# Patient Record
Sex: FEMALE | Race: WHITE | NOT HISPANIC OR LATINO | Employment: FULL TIME | URBAN - METROPOLITAN AREA
[De-identification: names, ages, dates, MRNs, and addresses within clinical notes are randomized per-mention and may not be internally consistent; named-entity substitution may affect disease eponyms.]

---

## 2021-10-21 NOTE — PROGRESS NOTES
RECORDS STATUS - BREAST    RECORDS REQUESTED FROM: [facility]   DATE REQUESTED: 10/25/2021   NOTES DETAILS STATUS   OFFICE NOTE from referring provider     OFFICE NOTE from medical oncologist N/A    OFFICE NOTE from surgeon Complete See Breast Biopsies in Epic 10/13/2021 and 9/21/2021   OFFICE NOTE from radiation oncologist     DISCHARGE SUMMARY from hospital     DISCHARGE REPORT from the ER     OPERATIVE REPORT Complete See Breast Biopsies in Epic 10/13/2021 and 9/21/2021   MEDICATION LIST Complete [facility]   CLINICAL TRIAL TREATMENTS TO DATE     LABS     REQUEST BLOCKS FOR ALL BREAST CANCER PTS     PATHOLOGY REPORTS  (Tissue diagnosis, Stage, ER/VA percentage positive and intensity of staining, HER2 IHC, FISH, and all biopsies from breast and any distant metastasis)                 Complete`- Report from [facility] in [facility] 10/13/2021   A) RIGHT BREAST, MAGNETIC SEED-LOCALIZED LUMPECTOMY:   1. Invasive ductal carcinoma, Gre[place] grade II of III       a. Size: 1.1 cm       b. Core biopsy site is associated with tumor   2. Ductal carcinoma in situ (DCIS): Extensive (nuclear grade 3, micropapillary and cribriform) measuring at least 2.2 cm   3. Margins:       a. Invasive carcinoma is 0.2 cm from the (initial) inferior margin       b. DCIS is 0.15 cm from the medial margin (single focus)   4. Breast Ancillary Testing: performed on previous biopsy ([id number])       a. Hormone Receptors:             Estrogen receptor: Positive (98%, strong staining)             Progesterone receptor: Positive (96%, strong staining)       b. HER2 by IHC: Negative (1+ by manual morphometry)   5. Surrounding breast with fibrocystic change     9/21/2021   A) RIGHT BREAST, 9:00, 1 CM FROM NIPPLE, ULTRASOUND-GUIDED CORE BIOPSY:   1. Invasive ductal carcinoma      a. Greentop grade: II of III; Greentop score: 7 of 9      b. Angio-lymphatic invasion: Absent      c. Associated DCIS: Present      d. Subtype: Solid, clinging        e. Grade  of DCIS: 3 of 3   2. Breast Ancillary Testing:         a. Hormone Receptors:             Estrogen receptor: Positive (98%, strong staining)             Progesterone receptor: Positive (96%, strong staining)       b. HER2 by IHC: Negative (1+ by manual morphometry)   GENONOMIC TESTING     TYPE:   (Next Generation Sequencing, including Foundation One testing, and Oncotype score)     IMAGING (NEED IMAGES & REPORT)     CT SCANS     MRI     MAMMO Complete - [facility] 10/14/2021   ULTRASOUND Complete - [facility]    PET     BONE SCAN     BRAIN MRI       Action    Action Taken 10/21/2021 11AM JOVANNI     I called Magalie to have imaging pushed to PACS [phone]     10/22/2021 8:42AM JOVANNI  I resolved imaging from Magalie in PACS

## 2021-10-25 ENCOUNTER — PRE VISIT (OUTPATIENT)
Dept: RADIATION ONCOLOGY | Facility: CLINIC | Age: 56
End: 2021-10-25

## 2021-10-25 ENCOUNTER — OFFICE VISIT (OUTPATIENT)
Dept: RADIATION ONCOLOGY | Facility: CLINIC | Age: 56
End: 2021-10-25
Payer: COMMERCIAL

## 2021-10-25 VITALS
OXYGEN SATURATION: 99 % | HEART RATE: 68 BPM | WEIGHT: 169.2 LBS | DIASTOLIC BLOOD PRESSURE: 76 MMHG | SYSTOLIC BLOOD PRESSURE: 157 MMHG | TEMPERATURE: 98.4 F | RESPIRATION RATE: 16 BRPM

## 2021-10-25 DIAGNOSIS — C50.411 MALIGNANT NEOPLASM OF UPPER-OUTER QUADRANT OF RIGHT BREAST IN FEMALE, ESTROGEN RECEPTOR POSITIVE (H): Primary | ICD-10-CM

## 2021-10-25 DIAGNOSIS — F43.21 GRIEF: ICD-10-CM

## 2021-10-25 DIAGNOSIS — Z17.0 MALIGNANT NEOPLASM OF UPPER-OUTER QUADRANT OF RIGHT BREAST IN FEMALE, ESTROGEN RECEPTOR POSITIVE (H): Primary | ICD-10-CM

## 2021-10-25 DIAGNOSIS — L58.9 RADIATION DERMATITIS: ICD-10-CM

## 2021-10-25 PROCEDURE — 77263 THER RADIOLOGY TX PLNG CPLX: CPT | Performed by: RADIOLOGY

## 2021-10-25 PROCEDURE — G0463 HOSPITAL OUTPT CLINIC VISIT: HCPCS

## 2021-10-25 PROCEDURE — 99205 OFFICE O/P NEW HI 60 MIN: CPT | Mod: 25 | Performed by: RADIOLOGY

## 2021-10-25 RX ORDER — FEXOFENADINE HCL 180 MG/1
180 TABLET ORAL DAILY
Qty: 60 TABLET | Refills: 0 | Status: SHIPPED | OUTPATIENT
Start: 2021-10-25

## 2021-10-25 RX ORDER — IBUPROFEN 200 MG
TABLET ORAL
COMMUNITY

## 2021-10-25 RX ORDER — ACETAMINOPHEN 325 MG/1
650 TABLET ORAL
COMMUNITY

## 2021-10-25 RX ORDER — MOMETASONE FUROATE 1 MG/ML
SOLUTION TOPICAL 3 TIMES DAILY PRN
Qty: 60 ML | Refills: 1 | Status: SHIPPED | OUTPATIENT
Start: 2021-10-25

## 2021-10-25 NOTE — Clinical Note
10/25/2021         RE: Terrie Fernandes  [address] [place] [address]        Dear Colleague,    Thank you for referring your patient, Terrie Fernandes, to the [facility] RADIATION ONCOLOGY [place]. Please see a copy of my visit note below.          [facility] Radiation Oncology Consult Note      Patient: Terrie Frenandes  MRN: [id number]  Date of Service: 10/25/2021    Assessment:       ICD-10-CM    1. Malignant neoplasm of upper-outer quadrant of right breast in female, estrogen receptor positive (H)  C50.411 fexofenadine (ALLEGRA) 180 MG tablet    Z17.0 mometasone (ELOCON) 0.1 % external solution     Oncology Psychotherapist Referral   2. Radiation dermatitis  L58.9 fexofenadine (ALLEGRA) 180 MG tablet     mometasone (ELOCON) 0.1 % external solution   3. Grief  F43.21 Oncology Psychotherapist Referral        Impression/Plan:   56 year old premenopausal female with IDC grade II, 1.1 cm, DCIS grade III, at least 2.2 cm, all margins negative (0.2cm from inferior IDC, 0.15cm from medial margin DCIS), (New posterior and inferior margins resected negative ) ER/OK pos, HER-2 negative. 0/4 SLN pos. Required aspiration      1. She just had aspiration earlier today and has compression dressing on. To be seen by surgery next week.  Hopefully won't need more aspiration as likely to re-accumulate. The external compression provided by surgical team should help.  Gentle ROM in preparation for XRT, but patient understands to avoid a lot of activity and aggressive ROM as that will exacerbate seroma.      2.  Three common misconceptions of radiation were addressed:              1) there is no transfer of heat, so no burns in traditional sense. Skin erythema from irritation.              2) there is no radioactivity at anytime during or after completion of each treatment.Treatment is just energy passing through target.               3) generally radiation does not cause immunosuppression.     3. Common  side effects to expect are fatigue and skin reaction which typically occur 1-2 weeks after start of radiation therapy.  Symptoms typically worsen throughout treatment, peak 1-2 weeks after completion of radiation, and then typically resolve over the next several weeks. We briefly discussed skin cares but will expand on this further in clinic when symptoms occur. There is a very small chance of cancer arising from radiation therapy but I explained the most vulnerable population for these tumors is the younger population (pediatric and young adult patients)  as these uncommon tumors typically arise 20+ years after treatment. The patient voiced understanding of the information discussed  .    4. Her   5 years ago from acute leukemia, so being in cancer world again understandably very difficult - referral to [facility]      Intent of Therapy: Curative  Patient on concurrent Herceptin No  Adjuvant hormonal therapy: Yes Agent: TBD  Start: Following radiation  Chemotherapy: N/A   Intended fractionation schedule:  Hypofractionation with boost    Breast cancer risk factors:   No family history of breast or ovarian cancer.   LMP 10/17/2021    LMP Dates from Last 4 Encounters:   No data found for LMP       We recommend adjuvant radiation as part of her breast conserving therapy to decrease her chance of local recurrence to the single digits. ROM stretches and skin care were discussed with the patient. She understands that these maneuvers need to continue for 6 months following completion of radiation as skin and muscle fibrosis continue to form for weeks to months following completion of therapy.      Side effects that may occur during or within weeks after radiation therapy      Fatigue and general weakness    Darkening, irritation, itchiness, redness, dryness, erythema, peeling, scabbing, ulceration and contraction of the skin of the breast and chest    Swelling of the breast    Loss of armpit hair    Lung  irritation    Decrease in appetite    Side effects that may occur months or years after radiation therapy      Development of another tumor or cancer    Thickening, telangiectasias (development of spider like blood vessels in the skin) and ulceration of the skin of the breast and chest    Firming, fibrosis (scar tissue), fat necrosis, and distortion of the breast    Poor healing after a trauma or surgery in the irradiated area    Nerve damage resulting in loss of arm strength and sensation    Lung inflammation of fibrosis causing cough, fever and shortness of breath    Fracture of the ribs    Swellingof an arm and hand    The risks, benefits and alternatives to radiation therapy were outlined with the patient. All questions were answered and a consent was signed.      Subjective:   This patient was referred to myself by Chasidy Barragan PA-C for consideration of radiation therapy.    HPI:  Terrie Fernandes is a 56 year old female with right breast IDC.    The patient had a bilateral screening mammogram on 9/20/2021, imaging studies showed an irregular mass within the right breast at 9:00 position. A needle biopsy of right breast on 9/21/2021 revealed IDC grade II and DCIS. MR bilateral breast on 9/28/2021 confirmed biopsy proven malignancy in right breast without evidence of further disease in either breast.      The patient had a right breast lumpectomy with SLN biopsy performed on 10/13/2021, final pathology showed IDC grade II, 1.1 cm, DCIS grade III, at least 2.2 cm, final margins negative (Initial 0.2cm from inferior IDC, 0.15cm from medial margin DCIS- additional tissue taken negative for malignancy ), ER/MT pos, HER-2 negative. 0/4 SLN pos. Post op course complicated by seroma needing aspiration.     Past Medical History:   Diagnosis Date     Cancer (H)      Past Surgical History:   Procedure Laterality Date     BREAST SURGERY       Current Outpatient Medications   Medication     fexofenadine (ALLEGRA) 180 MG  tablet     mometasone (ELOCON) 0.1 % external solution     acetaminophen (TYLENOL) 325 MG tablet     ibuprofen (ADVIL/MOTRIN) 200 MG tablet     No current facility-administered medications for this visit.     Sumatriptan  Social History     Socioeconomic History     Marital status:      Spouse name: Not on file     Number of children: Not on file     Years of education: Not on file     Highest education level: Not on file   Occupational History     Not on file   Tobacco Use     Smoking status: Never Smoker     Smokeless tobacco: Never Used   Substance and Sexual Activity     Alcohol use: Not on file     Drug use: Never     Sexual activity: Not on file   Other Topics Concern     Parent/sibling w/ CABG, MI or angioplasty before 65F 55M? Not Asked   Social History Narrative     Not on file     Social Determinants of Health     Financial Resource Strain:      Difficulty of Paying Living Expenses:    Food Insecurity:      Worried About Running Out of Food in the Last Year:      Ran Out of Food in the Last Year:    Transportation Needs:      Lack of Transportation (Medical):      Lack of Transportation (Non-Medical):    Physical Activity:      Days of Exercise per Week:      Minutes of Exercise per Session:    Stress:      Feeling of Stress :    Social Connections:      Frequency of Communication with Friends and Family:      Frequency of Social Gatherings with Friends and Family:      Attends [employer] Services:      Active Member of Clubs or Organizations:      Attends Club or Organization Meetings:      Marital Status:    Intimate Partner Violence:      Fear of Current or Ex-Partner:      Emotionally Abused:      Physically Abused:      Sexually Abused:      History reviewed. No pertinent family history.    ROS:  Reviewed with Terrie Fernandes today.    General     EENT     Respiratory          Cardiovascular  Cardiovascular (WDL): All cardiovascular elements are within defined limits  Endocrine      Gastrointestinal     Musculoskeletal     Integumentary                  Neurological     Psychological/Emotional   Patient Coping: Accepting;Open/discussion  Hematological/Lymphatic     Dermatologic     Genitourinary/Reproductive     Reproductive     Pain                  AUA Assessment                    Accompanied by         Objective:        Exam:    Vitals:    10/25/21 1300 10/25/21 1345   BP:  (!) 157/76   Pulse:  68   Resp:  16   Temp:  98.4  F (36.9  C)   TempSrc:  Oral   SpO2:  99%   Weight:  76.7 kg (169 lb 3.2 oz)   PainSc: No Pain (0)        GENERAL: No acute distress. Cooperative in conversation. Mask on.   Breasts:  expected post operative changes, no masses skin changes suggestive of recurrence or infection.   RESP: Normal respiratory effort.   ABD: Soft, nontender.  NEURO: Non focal. Alert and oriented x3.   PSYCH: Within normal limits. No depression or anxiety.  SKIN: Warm dry intact.   RIGHT BREAST- compressive dressings not fully removed, but both incisions intact.        Imaging: Imaging results MRI results reviewed personally.     Pathology:   A) RIGHT BREAST, MAGNETIC SEED-LOCALIZED LUMPECTOMY:   1. Invasive ductal carcinoma, Kristin grade II of III       a. Size: 1.1 cm       b. Core biopsy site is associated with tumor   2. Ductal carcinoma in situ (DCIS): Extensive (nuclear grade 3, micropapillary and cribriform) measuring at least 2.2 cm   3. Margins:       a. Invasive carcinoma is 0.2 cm from the (initial) inferior margin       b. DCIS is 0.15 cm from the medial margin (single focus)   4. Breast Ancillary Testing: performed on previous biopsy ([id number])       a. Hormone Receptors:             Estrogen receptor: Positive (98%, strong staining)             Progesterone receptor: Positive (96%, strong staining)       b. HER2 by IHC: Negative (1+ by manual morphometry)   5. Surrounding breast with fibrocystic change     B) RIGHT AXILLARY SENTINEL LYMPH NODES, EXCISION:   1. Benign  axillary lymph nodes (0/4)     C) RIGHT BREAST, POSTERIOR MARGIN, RE-EXCISION:   1. Negative for atypia or malignancy     D) RIGHT BREAST, INFERIOR MARGIN, RE-EXCISION:   1. Proliferative fibrocystic change   2. Negative for atypia or malignancy        60 minutes spent on the date of the encounter doing chart review, review of outside records, review of test results, interpretation of tests, patient visit, documentation, support as patient processed loss of  from cancer and being in cancer clinic.     I, Ally Presley MD personally performed the services described in this documentation, as scribed by Avila Díaz in my presence, and it is both accurate and complete.    Signed by: Ally Presley MD, MPH           Patient here ambulatory for radiation consult for breast cancer.  Patient states she had a seroma drained today and will be 2 weeks post op on Wednesday of this week.  Feels she is improving and healing since surgery.  15 minutes spent in review of radiation process and potential side effects.  Written information given for review.  Seen by Dr. Presley.  Plan return to clinic for follow up and/or CT simulation as directed by physician.    Radiation Therapy Patient Education    Person involved with teaching: Patient    Patient educational needs for self management of treatment-related side effects assessment completed.  EPIC Patient Ed tab contains Patient Learning Assessment    Education Materials Given  Radiation Therapy and You, Understanding Radiation Therapy, Radiation Therapy Team, Radiation Therapy Treatment, Radiation Therapy: Managing Short-Term Side Effects, Skin Care During Radiation Therapy, Radiation Therapy: Your Daily Life, Radiation Therapy to the Breast Guidelines, Welcome Letter, Insurance PA Information and Map [facility]    Educational Topics Discussed  Side effects expected    Response To Teaching  More review necessary    GYN Only  Vaginal Dilator-given and  educated: N/A    Referrals sent: None    Chemotherapy?  No          Again, thank you for allowing me to participate in the care of your patient.        Sincerely,        Ally Presley MD

## 2021-10-25 NOTE — PROGRESS NOTES
[facility] Radiation Oncology Consult Note      Patient: Terrie Fernandes  MRN: [id number]  Date of Service: 10/25/2021    Assessment:       ICD-10-CM    1. Malignant neoplasm of upper-outer quadrant of right breast in female, estrogen receptor positive (H)  C50.411 fexofenadine (ALLEGRA) 180 MG tablet    Z17.0 mometasone (ELOCON) 0.1 % external solution     Oncology Psychotherapist Referral   2. Radiation dermatitis  L58.9 fexofenadine (ALLEGRA) 180 MG tablet     mometasone (ELOCON) 0.1 % external solution   3. Grief  F43.21 Oncology Psychotherapist Referral        Impression/Plan:   56 year old premenopausal female with IDC grade II, 1.1 cm, DCIS grade III, at least 2.2 cm, all margins negative (0.2cm from inferior IDC, 0.15cm from medial margin DCIS), (New posterior and inferior margins resected negative ) ER/WV pos, HER-2 negative. 0/4 SLN pos. Required aspiration      1. She just had aspiration earlier today and has compression dressing on. To be seen by surgery next week.  Hopefully won't need more aspiration as likely to re-accumulate. The external compression provided by surgical team should help.  Gentle ROM in preparation for XRT, but patient understands to avoid a lot of activity and aggressive ROM as that will exacerbate seroma.      2.  Three common misconceptions of radiation were addressed:              1) there is no transfer of heat, so no burns in traditional sense. Skin erythema from irritation.              2) there is no radioactivity at anytime during or after completion of each treatment.Treatment is just energy passing through target.               3) generally radiation does not cause immunosuppression.     3. Common side effects to expect are fatigue and skin reaction which typically occur 1-2 weeks after start of radiation therapy.  Symptoms typically worsen throughout treatment, peak 1-2 weeks after completion of radiation, and then typically resolve over the next several  weeks. We briefly discussed skin cares but will expand on this further in clinic when symptoms occur. There is a very small chance of cancer arising from radiation therapy but I explained the most vulnerable population for these tumors is the younger population (pediatric and young adult patients)  as these uncommon tumors typically arise 20+ years after treatment. The patient voiced understanding of the information discussed  .    4. Her   5 years ago from acute leukemia, so being in cancer world again understandably very difficult - referral to [facility]      Intent of Therapy: Curative  Patient on concurrent Herceptin No  Adjuvant hormonal therapy: Yes Agent: TBD  Start: Following radiation  Chemotherapy: N/A   Intended fractionation schedule:  Hypofractionation with boost    Breast cancer risk factors:   No family history of breast or ovarian cancer.   LMP 10/17/2021    LMP Dates from Last 4 Encounters:   No data found for LMP       We recommend adjuvant radiation as part of her breast conserving therapy to decrease her chance of local recurrence to the single digits. ROM stretches and skin care were discussed with the patient. She understands that these maneuvers need to continue for 6 months following completion of radiation as skin and muscle fibrosis continue to form for weeks to months following completion of therapy.      Side effects that may occur during or within weeks after radiation therapy      Fatigue and general weakness    Darkening, irritation, itchiness, redness, dryness, erythema, peeling, scabbing, ulceration and contraction of the skin of the breast and chest    Swelling of the breast    Loss of armpit hair    Lung irritation    Decrease in appetite    Side effects that may occur months or years after radiation therapy      Development of another tumor or cancer    Thickening, telangiectasias (development of spider like blood vessels in the skin) and ulceration of the skin  of the breast and chest    Firming, fibrosis (scar tissue), fat necrosis, and distortion of the breast    Poor healing after a trauma or surgery in the irradiated area    Nerve damage resulting in loss of arm strength and sensation    Lung inflammation of fibrosis causing cough, fever and shortness of breath    Fracture of the ribs    Swellingof an arm and hand    The risks, benefits and alternatives to radiation therapy were outlined with the patient. All questions were answered and a consent was signed.      Subjective:   This patient was referred to myself by Chasidy Barragan PA-C for consideration of radiation therapy.    HPI:  Terrie Fernandes is a 56 year old female with right breast IDC.    The patient had a bilateral screening mammogram on 9/20/2021, imaging studies showed an irregular mass within the right breast at 9:00 position. A needle biopsy of right breast on 9/21/2021 revealed IDC grade II and DCIS. MR bilateral breast on 9/28/2021 confirmed biopsy proven malignancy in right breast without evidence of further disease in either breast.      The patient had a right breast lumpectomy with SLN biopsy performed on 10/13/2021, final pathology showed IDC grade II, 1.1 cm, DCIS grade III, at least 2.2 cm, final margins negative (Initial 0.2cm from inferior IDC, 0.15cm from medial margin DCIS- additional tissue taken negative for malignancy ), ER/[place] pos, HER-2 negative. 0/4 SLN pos. Post op course complicated by seroma needing aspiration.     Past Medical History:   Diagnosis Date     Cancer (H)      Past Surgical History:   Procedure Laterality Date     BREAST SURGERY       Current Outpatient Medications   Medication     fexofenadine (ALLEGRA) 180 MG tablet     mometasone (ELOCON) 0.1 % external solution     acetaminophen (TYLENOL) 325 MG tablet     ibuprofen (ADVIL/MOTRIN) 200 MG tablet     No current facility-administered medications for this visit.     Sumatriptan  Social History     Socioeconomic History      Marital status:      Spouse name: Not on file     Number of children: Not on file     Years of education: Not on file     Highest education level: Not on file   Occupational History     Not on file   Tobacco Use     Smoking status: Never Smoker     Smokeless tobacco: Never Used   Substance and Sexual Activity     Alcohol use: Not on file     Drug use: Never     Sexual activity: Not on file   Other Topics Concern     Parent/sibling w/ CABG, MI or angioplasty before 65F 55M? Not Asked   Social History Narrative     Not on file     Social Determinants of Health     Financial Resource Strain:      Difficulty of Paying Living Expenses:    Food Insecurity:      Worried About Running Out of Food in the Last Year:      Ran Out of Food in the Last Year:    Transportation Needs:      Lack of Transportation (Medical):      Lack of Transportation (Non-Medical):    Physical Activity:      Days of Exercise per Week:      Minutes of Exercise per Session:    Stress:      Feeling of Stress :    Social Connections:      Frequency of Communication with Friends and Family:      Frequency of Social Gatherings with Friends and Family:      Attends Gnosticist Services:      Active Member of Clubs or Organizations:      Attends Club or Organization Meetings:      Marital Status:    Intimate Partner Violence:      Fear of Current or Ex-Partner:      Emotionally Abused:      Physically Abused:      Sexually Abused:      History reviewed. No pertinent family history.    ROS:  Reviewed with Terrie Fernandes today.    General     EENT     Respiratory          Cardiovascular  Cardiovascular (WDL): All cardiovascular elements are within defined limits  Endocrine     Gastrointestinal     Musculoskeletal     Integumentary                  Neurological     Psychological/Emotional   Patient Coping: Accepting;Open/discussion  Hematological/Lymphatic     Dermatologic     Genitourinary/Reproductive     Reproductive     Pain                   AUA Assessment                    Accompanied by         Objective:        Exam:    Vitals:    10/25/21 1300 10/25/21 1345   BP:  (!) 157/76   Pulse:  68   Resp:  16   Temp:  98.4  F (36.9  C)   TempSrc:  Oral   SpO2:  99%   Weight:  76.7 kg (169 lb 3.2 oz)   PainSc: No Pain (0)        GENERAL: No acute distress. Cooperative in conversation. Mask on.   Breasts:  expected post operative changes, no masses skin changes suggestive of recurrence or infection.   RESP: Normal respiratory effort.   ABD: Soft, nontender.  NEURO: Non focal. Alert and oriented x3.   PSYCH: Within normal limits. No depression or anxiety.  SKIN: Warm dry intact.   RIGHT BREAST- compressive dressings not fully removed, but both incisions intact.        Imaging: Imaging results MRI results reviewed personally.     Pathology:   A) RIGHT BREAST, MAGNETIC SEED-LOCALIZED LUMPECTOMY:   1. Invasive ductal carcinoma, Kristin grade II of III       a. Size: 1.1 cm       b. Core biopsy site is associated with tumor   2. Ductal carcinoma in situ (DCIS): Extensive (nuclear grade 3, micropapillary and cribriform) measuring at least 2.2 cm   3. Margins:       a. Invasive carcinoma is 0.2 cm from the (initial) inferior margin       b. DCIS is 0.15 cm from the medial margin (single focus)   4. Breast Ancillary Testing: performed on previous biopsy ([id number])       a. Hormone Receptors:             Estrogen receptor: Positive (98%, strong staining)             Progesterone receptor: Positive (96%, strong staining)       b. HER2 by IHC: Negative (1+ by manual morphometry)   5. Surrounding breast with fibrocystic change     B) RIGHT AXILLARY SENTINEL LYMPH NODES, EXCISION:   1. Benign axillary lymph nodes (0/4)     C) RIGHT BREAST, POSTERIOR MARGIN, RE-EXCISION:   1. Negative for atypia or malignancy     D) RIGHT BREAST, INFERIOR MARGIN, RE-EXCISION:   1. Proliferative fibrocystic change   2. Negative for atypia or malignancy        60 minutes spent on the  date of the encounter doing chart review, review of outside records, review of test results, interpretation of tests, patient visit, documentation, support as patient processed loss of  from cancer and being in cancer clinic.     I, Ally Presley MD personally performed the services described in this documentation, as scribed by Avila Díaz in my presence, and it is both accurate and complete.    Signed by: Ally Presley MD, MPH

## 2021-10-25 NOTE — PROGRESS NOTES
Patient here ambulatory for radiation consult for breast cancer.  Patient states she had a seroma drained today and will be 2 weeks post op on Wednesday of this week.  Feels she is improving and healing since surgery.  15 minutes spent in review of radiation process and potential side effects.  Written information given for review.  Seen by Dr. Presley.  Plan return to clinic for follow up and/or CT simulation as directed by physician.    Radiation Therapy Patient Education    Person involved with teaching: Patient    Patient educational needs for self management of treatment-related side effects assessment completed.  EPIC Patient Ed tab contains Patient Learning Assessment    Education Materials Given  Radiation Therapy and You, Understanding Radiation Therapy, Radiation Therapy Team, Radiation Therapy Treatment, Radiation Therapy: Managing Short-Term Side Effects, Skin Care During Radiation Therapy, Radiation Therapy: Your Daily Life, Radiation Therapy to the Breast Guidelines, Welcome Letter, Insurance PA Information and Map [facility]    Educational Topics Discussed  Side effects expected    Response To Teaching  More review necessary    GYN Only  Vaginal Dilator-given and educated: N/A    Referrals sent: None    Chemotherapy?  No

## 2021-10-26 ENCOUNTER — TRANSFERRED RECORDS (OUTPATIENT)
Dept: HEALTH INFORMATION MANAGEMENT | Facility: CLINIC | Age: 56
End: 2021-10-26

## 2021-10-26 ENCOUNTER — PATIENT OUTREACH (OUTPATIENT)
Dept: ONCOLOGY | Facility: CLINIC | Age: 56
End: 2021-10-26

## 2021-10-26 NOTE — PROGRESS NOTES
Writer received referral for Oncology Psychology. Reviewed referral for appropriate location and information, sent to New Patient Scheduling for completion.

## 2021-11-05 NOTE — PROGRESS NOTES
RECORDS STATUS - BREAST    RECORDS REQUESTED FROM: EPIC   DATE REQUESTED: 11/10/2021   NOTES DETAILS STATUS   OFFICE NOTE from referring provider     OFFICE NOTE from medical oncologist N/A    OFFICE NOTE from surgeon Complete See Breast Biopsy in EPIC   OFFICE NOTE from radiation oncologist     DISCHARGE SUMMARY from hospital     DISCHARGE REPORT from the ER     OPERATIVE REPORT Complete See Breast Biopsy in EPIC   MEDICATION LIST Complete [facility]   CLINICAL TRIAL TREATMENTS TO DATE     LABS     REQUEST BLOCKS FOR ALL BREAST CANCER PTS     PATHOLOGY REPORTS  (Tissue diagnosis, Stage, ER/UT percentage positive and intensity of staining, HER2 IHC, FISH, and all biopsies from breast and any distant metastasis)                 Complete- CE ([facility])  10/13/2021    A) RIGHT BREAST, MAGNETIC SEED-LOCALIZED LUMPECTOMY:   1. Invasive ductal carcinoma, Kristin grade II of III       a. Size: 1.1 cm       b. Core biopsy site is associated with tumor   2. Ductal carcinoma in situ (DCIS):         GENONOMIC TESTING     TYPE:   (Next Generation Sequencing, including Foundation One testing, and Oncotype score)     IMAGING (NEED IMAGES & REPORT)     CT SCANS     MRI     MAMMO Complete 10/14/2021 ([facility]) in PACS   ULTRASOUND Complete 10/14/2021 in PACS   PET     BONE SCAN     BRAIN MRI

## 2021-11-09 ENCOUNTER — ALLIED HEALTH/NURSE VISIT (OUTPATIENT)
Dept: RADIATION ONCOLOGY | Facility: HOSPITAL | Age: 56
End: 2021-11-09
Attending: RADIOLOGY
Payer: COMMERCIAL

## 2021-11-09 DIAGNOSIS — C50.411 MALIGNANT NEOPLASM OF UPPER-OUTER QUADRANT OF RIGHT BREAST IN FEMALE, ESTROGEN RECEPTOR POSITIVE (H): Primary | ICD-10-CM

## 2021-11-09 DIAGNOSIS — Z17.0 MALIGNANT NEOPLASM OF UPPER-OUTER QUADRANT OF RIGHT BREAST IN FEMALE, ESTROGEN RECEPTOR POSITIVE (H): Primary | ICD-10-CM

## 2021-11-09 PROCEDURE — 77290 THER RAD SIMULAJ FIELD CPLX: CPT | Mod: 26 | Performed by: RADIOLOGY

## 2021-11-09 PROCEDURE — 77334 RADIATION TREATMENT AID(S): CPT | Performed by: RADIOLOGY

## 2021-11-09 PROCEDURE — 77290 THER RAD SIMULAJ FIELD CPLX: CPT | Performed by: RADIOLOGY

## 2021-11-09 PROCEDURE — 77334 RADIATION TREATMENT AID(S): CPT | Mod: 26 | Performed by: RADIOLOGY

## 2021-11-10 ENCOUNTER — VIRTUAL VISIT (OUTPATIENT)
Dept: ONCOLOGY | Facility: HOSPITAL | Age: 56
End: 2021-11-10
Attending: SOCIAL WORKER
Payer: COMMERCIAL

## 2021-11-10 ENCOUNTER — PRE VISIT (OUTPATIENT)
Dept: ONCOLOGY | Facility: HOSPITAL | Age: 56
End: 2021-11-10

## 2021-11-10 DIAGNOSIS — F43.23 ADJUSTMENT DISORDER WITH MIXED ANXIETY AND DEPRESSED MOOD: Primary | ICD-10-CM

## 2021-11-10 DIAGNOSIS — C50.911 INFILTRATING DUCTAL CARCINOMA OF RIGHT BREAST (H): ICD-10-CM

## 2021-11-10 DIAGNOSIS — F43.21 GRIEF REACTION: ICD-10-CM

## 2021-11-10 PROCEDURE — 90837 PSYTX W PT 60 MINUTES: CPT | Mod: TEL,95 | Performed by: SOCIAL WORKER

## 2021-11-10 NOTE — CONFIDENTIAL NOTE
Psychology Psychotherapy  Note-virtual telephone visit    Name:  Terrie Fernandes  :  1965  MRN:  [id number]      Date of Service: 11/10/2021  Duration: 60 minutes (9:00-10:00)    The patient has been notified of following:      This telephone visit will be conducted via a call between you and your provider. We have found that certain health care needs can be provided without a face to face meeting.  This service lets us provide the care you need with a short phone conversation.      Telephone visits are billed at different rates depending on your insurance coverage. During this emergency period, for some insurers they may be billed the same as an in-person visit.  Please reach out to your insurance provider with any questions.     If during the course of the call the provider feels a telephone visit is not appropriate, you will not be charged for this service.     Patient has given verbal consent to a Telephone visit? Yes    Target Symptoms:    The patient was seen in light of concerns regarding symptoms of anxiety, grief and depression as evidenced by patient and staff report.    Participation:  The patient was able to participate and benefit from treatment as evidenced by her verbal expression of ideas and initiation of topics discussed.    Mental Status:    Mood/Affect:  normal affect  Suicidal Ideation:  absent  Homicidal Ideation:  absent  Thought process:  normal  Thought content:  Clear  Fund of Knowledge:  Sufficient  Attention/Concentration:  Normal  Language ability:  intact  Speech: normal  Memory:  recent and remote memory intact  Insight and Judgement:  good  Orientation:  self, place and time  Appearance: N/A-telephone visit  Eye Contact: N/A-telephone visit  Estimated IQ:  Average      Intervention:    Terrie was referred to me by Dr. Presley.  She has a diagnosis of  invasive ductal carcinoma, grade 2 of the upper outer quadrant of the right female breast.  She was diagnosed after a routine  "mammogram on 2021..  She had a lumpectomy and biopsy on 10/13/2021.  She has met with Dr. Rangel at the [facility]  With Magalie.  She recently learned that her Oncotype had a score of 12, which indicates that she does not need chemotherapy.  The plan is for her to start radiation therapy at [facility] on Thursday, 2021.  She anticipates that her radiation therapy will be 4 weeks, with a completion date of 2021.  She processed her thoughts and feelings about her cancer diagnosis and her treatment plan.    Terrie also shared about her , Aristeo, who  6-1/2 years ago on May 24 from AML.  This was the day after the birthday her son, who was 18 years old at the time.   Aristeo was 48 years old when he .  She states that he was a completely healthy person who was in good physical shape.  She states that he had a healthy diet and did yoga.  He was hospitalized at [facility] shortly after his diagnosis and only lived for about a month from the time he was diagnosed.  He ended up being on life support, which may decided to discontinue.  He  worked as an .  She states that he was driven and very high energy.  She also talked about what a wonderful father he was.  Her children.  They had been  for 23 years.  She originally met him because her college roommate and his college roommate were dating.  A few years after graduation, her college roommate \"picks them up\".  We spent a great deal of time talking about her , Aristeo and how she is coping with her grief.  She states that her focus after his death was to be supportive to her children, and she tended to set her own grief aside.  By being back at the cancer clinic, she found that some unresolved grief issues have surfaced.  We discussed strategies to help her work through her grief.    When Terrie was diagnosed with breast cancer, she was extremely concerned about her children and worry about how " "this would affect them as they had already lost their father from cancer.  She has 3 children, 2 daughters and a son.  Her son was 18 years old when his father .  She states that he took it the hardest as they were exceptionally close.  It also brought out some issues of anger after his death.  She also has 2 daughters who were 14 and 16 the time of her father's death.  Currently her son, neck, is 25 years old.  He lives in [place].  He went to college in [employer] and became a .  He developed Crohn's disease during his sophomore year of college.  She worried most about how he would handle her cancer.  She was relieved that she does not have to have chemotherapy and has made the decision that she is not cannot share with him about her cancer, possibly until she has completed her treatment.  Her daughters include Funmi, age 23 who she describes as being strong-willed and sweet.  Funmi went to [employer] and is in her second year at the [employer] school.  Her youngest daughter is Digna who currently is a senior at [employer] studying social work.  She states that she has \"heart of gold\" and tends to share every emotion with Terrie.  Digna has struggled emotionally during this time of COVID-19 where there is more isolation during her time in college.  Digna play soccer in college.  Terrie decided to tell her 2 daughters about her cancer, as she feels she needs their help.  She is sharing the news about her cancer with a very small group of her closest support system.    Terrie grew up in [place], which she thought would be comforting going [facility].  She felt that it did trigger some of the emotions regarding her past.  Terrie's parents are 81 and 82.  [place] [facility] [place].  She states that her mom is in great health, but had a stent placement recently.  She states that her father fell this summer.  He ended up seeing \"okay\", but she realizes " that they are starting to age.    Terrie was the middle child in a family of 3.  She has an older brother, Cornell who is 2 years older than her.  He lives in [place] and she is very close with his wife.  Her brother works as a federal court .  Her younger brother, Radhames is 2 years younger.  He also is an  and lives in [place].  He comes to visit in the summer and she also goes out to visit him once a year.    Terrie indicates that she was raised Church, but currently is not practicing her michele.  She processed her thoughts and feelings about her spirituality and her need to take a break during her time of grieving.    Terrie has a wonderful support network of friends.  She states that the neighbors across the street had a 19-year-old son who  when he was a abraham in college, 2 years before Aristeo' death.  They have been a wonderful support system for her during her time of grieving.    In the very difficult situation surrounding her 's death was that he did not have a relationship with his parents.  Prior to him being sick, they had not had any contact with his parents for 10 years.  She talked about how she navigated this issue at the time of his death.    Terrie shared her thoughts and beliefs about after life.  She has the image of Aristeo being in [place] sitting on the front porch with all of the dogs that the family has had with him, along with his grandfather.  She states that the anniversary of his death has always been tricky as it is a day away from her son's birthday.  We discussed strategies that might help navigating these events.    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    Terrie does not have a mental health history.  She does not meet diagnostic criteria for an mood disorder or an anxiety disorder.  She is experiencing symptoms of anxiety and depression related to coping with her cancer diagnosis and working for her issues of grief.  She  also meets diagnostic criteria for grief reaction.  She is interested in ongoing psychotherapy to help her manage the emotional aspects of her cancer and cancer treatment.  She also is interested in assistance in helping her work through her grief and loss related to the death of her .  We will plan to schedule her next session as an in person visit at [facility], either before or after her radiation therapy.    Psychoterapeutic Techniques:  Cognitive-behavioral therapy, motivational interviewing and supportive psychotherapy strategies were utilized.    Necessity:    The session was necessary for the care of the patient to address symptoms of depression and anxiety related to the patient's medical condition.    Progress/Plan:    Terrie is interested in ongoing individual psychotherapy to help her work through the emotional aspects of her cancer and cancer treatment.  She also is interested in working through issues related to grief and loss pertaining to the death of her  from cancer.  We discussed strategies to help her cope.  We also talked about ways to navigate through some of the difficult times, especially the anniversary of his death, that is the day after her son's birthday.  I will plan to meet with her for an in person visit at [facility] either before or after one of her radiation treatments.  I will ask our information coordinators to contact her to schedule this appointment.    Diagnosis:    1.  Adjustment disorder with mixed anxiety and depressed mood  2.  Grief reaction  3.  Invasive ductal carcinoma    This note was created with the help of Dragon dictation system.  Grammatical and typing errors are not intentional.    Provider: Paloma Higgins MA, LP, LICSW    Date:  11/10/2021  Time:  12:26 PM

## 2021-11-12 ENCOUNTER — APPOINTMENT (OUTPATIENT)
Dept: RADIATION ONCOLOGY | Facility: CLINIC | Age: 56
End: 2021-11-12
Attending: RADIOLOGY
Payer: COMMERCIAL

## 2021-11-12 PROCEDURE — 77300 RADIATION THERAPY DOSE PLAN: CPT | Mod: 26 | Performed by: RADIOLOGY

## 2021-11-12 PROCEDURE — 77295 3-D RADIOTHERAPY PLAN: CPT | Mod: 26 | Performed by: RADIOLOGY

## 2021-11-12 PROCEDURE — 77295 3-D RADIOTHERAPY PLAN: CPT | Performed by: RADIOLOGY

## 2021-11-12 PROCEDURE — 77334 RADIATION TREATMENT AID(S): CPT | Performed by: RADIOLOGY

## 2021-11-12 PROCEDURE — 77300 RADIATION THERAPY DOSE PLAN: CPT | Performed by: RADIOLOGY

## 2021-11-12 PROCEDURE — 77334 RADIATION TREATMENT AID(S): CPT | Mod: 26 | Performed by: RADIOLOGY

## 2021-11-15 ENCOUNTER — LAB (OUTPATIENT)
Dept: LAB | Facility: CLINIC | Age: 56
End: 2021-11-15
Attending: RADIOLOGY
Payer: COMMERCIAL

## 2021-11-15 DIAGNOSIS — C50.411 MALIGNANT NEOPLASM OF UPPER-OUTER QUADRANT OF RIGHT BREAST IN FEMALE, ESTROGEN RECEPTOR POSITIVE (H): ICD-10-CM

## 2021-11-15 DIAGNOSIS — Z17.0 MALIGNANT NEOPLASM OF UPPER-OUTER QUADRANT OF RIGHT BREAST IN FEMALE, ESTROGEN RECEPTOR POSITIVE (H): ICD-10-CM

## 2021-11-15 PROCEDURE — U0005 INFEC AGEN DETEC AMPLI PROBE: HCPCS

## 2021-11-15 PROCEDURE — U0003 INFECTIOUS AGENT DETECTION BY NUCLEIC ACID (DNA OR RNA); SEVERE ACUTE RESPIRATORY SYNDROME CORONAVIRUS 2 (SARS-COV-2) (CORONAVIRUS DISEASE [COVID-19]), AMPLIFIED PROBE TECHNIQUE, MAKING USE OF HIGH THROUGHPUT TECHNOLOGIES AS DESCRIBED BY CMS-2020-01-R: HCPCS

## 2021-11-16 LAB — SARS-COV-2 RNA RESP QL NAA+PROBE: NEGATIVE

## 2021-11-18 ENCOUNTER — APPOINTMENT (OUTPATIENT)
Dept: RADIATION ONCOLOGY | Facility: CLINIC | Age: 56
End: 2021-11-18
Attending: RADIOLOGY
Payer: COMMERCIAL

## 2021-11-18 VITALS
OXYGEN SATURATION: 99 % | SYSTOLIC BLOOD PRESSURE: 138 MMHG | HEART RATE: 56 BPM | DIASTOLIC BLOOD PRESSURE: 68 MMHG | WEIGHT: 170.9 LBS | TEMPERATURE: 98.3 F | RESPIRATION RATE: 16 BRPM

## 2021-11-18 DIAGNOSIS — Z17.0 MALIGNANT NEOPLASM OF UPPER-OUTER QUADRANT OF RIGHT BREAST IN FEMALE, ESTROGEN RECEPTOR POSITIVE (H): Primary | ICD-10-CM

## 2021-11-18 DIAGNOSIS — C50.411 MALIGNANT NEOPLASM OF UPPER-OUTER QUADRANT OF RIGHT BREAST IN FEMALE, ESTROGEN RECEPTOR POSITIVE (H): Primary | ICD-10-CM

## 2021-11-18 PROCEDURE — 77280 THER RAD SIMULAJ FIELD SMPL: CPT | Performed by: RADIOLOGY

## 2021-11-18 PROCEDURE — 77280 THER RAD SIMULAJ FIELD SMPL: CPT | Mod: 26 | Performed by: RADIOLOGY

## 2021-11-18 PROCEDURE — 77387 GUIDANCE FOR RADJ TX DLVR: CPT | Performed by: RADIOLOGY

## 2021-11-18 PROCEDURE — 77412 RADIATION TX DELIVERY LVL 3: CPT | Performed by: RADIOLOGY

## 2021-11-18 RX ORDER — DOCUSATE SODIUM 50MG AND SENNOSIDES 8.6MG 8.6; 5 MG/1; MG/1
TABLET, FILM COATED ORAL
COMMUNITY
Start: 2021-10-13 | End: 2021-12-02

## 2021-11-18 RX ORDER — HYDROCODONE BITARTRATE AND ACETAMINOPHEN 5; 325 MG/1; MG/1
TABLET ORAL
COMMUNITY
Start: 2021-10-13 | End: 2021-12-02

## 2021-11-18 NOTE — LETTER
2021         RE: Terrie Fernandes  [address] [place] [address]        Dear Colleague,    Thank you for referring your patient, Terrie Fernandes, to the [facility] RADIATION ONCOLOGY [place]. Please see a copy of my visit note below.         RADIATION ONCOLOGY WEEKLY TREATMENT VISIT NOTE    Assessment:       ICD-10-CM    1. Malignant neoplasm of upper-outer quadrant of right breast in female, estrogen receptor positive (H)  C50.411     Z17.0         Impression/Plan:   56 year old female with right IDC grade II, 1.1 cm, DCIS grade III, at least 2.2 cm, all margins negative (0.2cm from inferior IDC, 0.15cm from medial margin DCIS), (New posterior and inferior margins resected negative ) ER/AK pos, HER-2 negative. 0/4 SLN pos. Required aspiration.    1. Continue radiation therapy as prescribed.    2. Provided with Radiaplex today and demonstrated where to apply on right breast.     Radiation: Site: Right Breast  Stereotactic Radiosurgery: No  Today's Dose: 265  Total Dose for Breast: 5240  Today's Fraction/Total Fraction Breast:       Subjective:     HPI: Terrie Fernandes is a 56 year old female with Malignant neoplasm of upper-outer quadrant of right breast in female, estrogen receptor positive (H)      The following portions of the patient's history were reviewed and updated as appropriate: allergies, current medications, past family history, past medical history, past social history, past surgical history and problem list.    Assessment   Body Site:  Breast                           Site: Right Breast  Stereotactic Radiosurgery: No  Today's Dose: 265  Total Dose for Breast: 5240  Today's Fraction/Total Fraction Breast:   Drainage: 0: Absent              Emotional Alteration    Copin: Effective  Comfort Alteration   KPS: 100 % Normal, no complaints  Fatigue (ONS scale): 0: No Fatigue  Pain Location: denies   Nutrition Alteration   Anorexia: 0: None  Nausea: 0: None  Vomiting:  0: None  Weight: 77.5 kg (170 lb 14.4 oz)  Skin Alteration   Skin Sensation: 0: No problem  Skin Reaction: 0: None (radiaplex given w/instructions)      Objective:     Exam:     Vitals:    11/18/21 1141   BP: 138/68   Pulse: 56   Resp: 16   Temp: 98.3  F (36.8  C)   TempSrc: Oral   SpO2: 99%   Weight: 77.5 kg (170 lb 14.4 oz)       Wt Readings from Last 8 Encounters:   11/18/21 77.5 kg (170 lb 14.4 oz)   10/25/21 76.7 kg (169 lb 3.2 oz)       General: Alert and oriented. Sitting comfortably.   Patient non acute appearing, asymptomatic. Mask on. No cough, no respiratory distress.   Terrie has no erythema.    Treatment Summary to Date    Aria chart and setup information reviewed    I, Ally Presley MD personally performed the services described in this documentation, as scribed by Avila Díaz in my presence, and it is both accurate and complete.    Signed by: Ally Presley MD, MPH       Again, thank you for allowing me to participate in the care of your patient.        Sincerely,        Ally Presley MD

## 2021-11-18 NOTE — PROGRESS NOTES
RADIATION ONCOLOGY WEEKLY TREATMENT VISIT NOTE    Assessment:       ICD-10-CM    1. Malignant neoplasm of upper-outer quadrant of right breast in female, estrogen receptor positive (H)  C50.411     Z17.0         Impression/Plan:   56 year old female with right IDC grade II, 1.1 cm, DCIS grade III, at least 2.2 cm, all margins negative (0.2cm from inferior IDC, 0.15cm from medial margin DCIS), (New posterior and inferior margins resected negative ) ER/[place] pos, HER-2 negative. 0/4 SLN pos. Required aspiration.    1. Continue radiation therapy as prescribed.    2. Provided with Radiaplex today and demonstrated where to apply on right breast.     Radiation: Site: Right Breast  Stereotactic Radiosurgery: No  Today's Dose: 265  Total Dose for Breast: 5240  Today's Fraction/Total Fraction Breast:       Subjective:     HPI: Terrie Fernandes is a 56 year old female with Malignant neoplasm of upper-outer quadrant of right breast in female, estrogen receptor positive (H)      The following portions of the patient's history were reviewed and updated as appropriate: allergies, current medications, past family history, past medical history, past social history, past surgical history and problem list.    Assessment   Body Site:  Breast                           Site: Right Breast  Stereotactic Radiosurgery: No  Today's Dose: 265  Total Dose for Breast: 5240  Today's Fraction/Total Fraction Breast:   Drainage: 0: Absent              Emotional Alteration    Copin: Effective  Comfort Alteration   KPS: 100 % Normal, no complaints  Fatigue (ONS scale): 0: No Fatigue  Pain Location: denies   Nutrition Alteration   Anorexia: 0: None  Nausea: 0: None  Vomitin: None  Weight: 77.5 kg (170 lb 14.4 oz)  Skin Alteration   Skin Sensation: 0: No problem  Skin Reaction: 0: None (radiaplex given w/instructions)      Objective:     Exam:     Vitals:    21 1141   BP: 138/68   Pulse: 56   Resp: 16   Temp: 98.3  F (36.8  C)    TempSrc: Oral   SpO2: 99%   Weight: 77.5 kg (170 lb 14.4 oz)       Wt Readings from Last 8 Encounters:   11/18/21 77.5 kg (170 lb 14.4 oz)   10/25/21 76.7 kg (169 lb 3.2 oz)       General: Alert and oriented. Sitting comfortably.   Patient non acute appearing, asymptomatic. Mask on. No cough, no respiratory distress.   Terrie has no erythema.    Treatment Summary to Date    Aria chart and setup information reviewed    I, Ally Presley MD personally performed the services described in this documentation, as scribed by Avila Díaz in my presence, and it is both accurate and complete.    Signed by: Ally Presley MD, MPH

## 2021-11-19 ENCOUNTER — APPOINTMENT (OUTPATIENT)
Dept: RADIATION ONCOLOGY | Facility: CLINIC | Age: 56
End: 2021-11-19
Attending: RADIOLOGY
Payer: COMMERCIAL

## 2021-11-19 PROCEDURE — 77387 GUIDANCE FOR RADJ TX DLVR: CPT | Performed by: STUDENT IN AN ORGANIZED HEALTH CARE EDUCATION/TRAINING PROGRAM

## 2021-11-19 PROCEDURE — 77412 RADIATION TX DELIVERY LVL 3: CPT | Performed by: STUDENT IN AN ORGANIZED HEALTH CARE EDUCATION/TRAINING PROGRAM

## 2021-11-22 ENCOUNTER — APPOINTMENT (OUTPATIENT)
Dept: RADIATION ONCOLOGY | Facility: CLINIC | Age: 56
End: 2021-11-22
Attending: RADIOLOGY
Payer: COMMERCIAL

## 2021-11-22 PROCEDURE — 77412 RADIATION TX DELIVERY LVL 3: CPT | Performed by: RADIOLOGY

## 2021-11-22 PROCEDURE — 77387 GUIDANCE FOR RADJ TX DLVR: CPT | Performed by: STUDENT IN AN ORGANIZED HEALTH CARE EDUCATION/TRAINING PROGRAM

## 2021-11-22 PROCEDURE — 77387 GUIDANCE FOR RADJ TX DLVR: CPT | Performed by: RADIOLOGY

## 2021-11-23 ENCOUNTER — APPOINTMENT (OUTPATIENT)
Dept: RADIATION ONCOLOGY | Facility: CLINIC | Age: 56
End: 2021-11-23
Attending: RADIOLOGY
Payer: COMMERCIAL

## 2021-11-23 PROCEDURE — 77387 GUIDANCE FOR RADJ TX DLVR: CPT | Performed by: STUDENT IN AN ORGANIZED HEALTH CARE EDUCATION/TRAINING PROGRAM

## 2021-11-23 PROCEDURE — 77387 GUIDANCE FOR RADJ TX DLVR: CPT | Performed by: RADIOLOGY

## 2021-11-23 PROCEDURE — 77412 RADIATION TX DELIVERY LVL 3: CPT | Performed by: STUDENT IN AN ORGANIZED HEALTH CARE EDUCATION/TRAINING PROGRAM

## 2021-11-24 ENCOUNTER — APPOINTMENT (OUTPATIENT)
Dept: RADIATION ONCOLOGY | Facility: CLINIC | Age: 56
End: 2021-11-24
Attending: RADIOLOGY
Payer: COMMERCIAL

## 2021-11-24 PROCEDURE — 77387 GUIDANCE FOR RADJ TX DLVR: CPT | Performed by: STUDENT IN AN ORGANIZED HEALTH CARE EDUCATION/TRAINING PROGRAM

## 2021-11-24 PROCEDURE — 77336 RADIATION PHYSICS CONSULT: CPT | Performed by: RADIOLOGY

## 2021-11-24 PROCEDURE — 77334 RADIATION TREATMENT AID(S): CPT | Mod: 26 | Performed by: RADIOLOGY

## 2021-11-24 PROCEDURE — 77307 TELETHX ISODOSE PLAN CPLX: CPT | Performed by: RADIOLOGY

## 2021-11-24 PROCEDURE — 77307 TELETHX ISODOSE PLAN CPLX: CPT | Mod: 26 | Performed by: RADIOLOGY

## 2021-11-24 PROCEDURE — 77427 RADIATION TX MANAGEMENT X5: CPT | Performed by: RADIOLOGY

## 2021-11-24 PROCEDURE — 77334 RADIATION TREATMENT AID(S): CPT | Performed by: RADIOLOGY

## 2021-11-24 PROCEDURE — 77412 RADIATION TX DELIVERY LVL 3: CPT | Performed by: STUDENT IN AN ORGANIZED HEALTH CARE EDUCATION/TRAINING PROGRAM

## 2021-11-26 ENCOUNTER — APPOINTMENT (OUTPATIENT)
Dept: RADIATION ONCOLOGY | Facility: CLINIC | Age: 56
End: 2021-11-26
Attending: RADIOLOGY
Payer: COMMERCIAL

## 2021-11-26 PROCEDURE — 77412 RADIATION TX DELIVERY LVL 3: CPT | Performed by: RADIOLOGY

## 2021-11-26 PROCEDURE — 77387 GUIDANCE FOR RADJ TX DLVR: CPT | Performed by: RADIOLOGY

## 2021-11-29 ENCOUNTER — APPOINTMENT (OUTPATIENT)
Dept: RADIATION ONCOLOGY | Facility: CLINIC | Age: 56
End: 2021-11-29
Attending: RADIOLOGY
Payer: COMMERCIAL

## 2021-11-29 PROCEDURE — 77412 RADIATION TX DELIVERY LVL 3: CPT | Performed by: STUDENT IN AN ORGANIZED HEALTH CARE EDUCATION/TRAINING PROGRAM

## 2021-11-29 PROCEDURE — 77387 GUIDANCE FOR RADJ TX DLVR: CPT | Performed by: STUDENT IN AN ORGANIZED HEALTH CARE EDUCATION/TRAINING PROGRAM

## 2021-11-30 ENCOUNTER — APPOINTMENT (OUTPATIENT)
Dept: RADIATION ONCOLOGY | Facility: CLINIC | Age: 56
End: 2021-11-30
Attending: RADIOLOGY
Payer: COMMERCIAL

## 2021-11-30 PROCEDURE — 77427 RADIATION TX MANAGEMENT X5: CPT | Performed by: RADIOLOGY

## 2021-11-30 PROCEDURE — 77412 RADIATION TX DELIVERY LVL 3: CPT | Performed by: STUDENT IN AN ORGANIZED HEALTH CARE EDUCATION/TRAINING PROGRAM

## 2021-11-30 PROCEDURE — 77336 RADIATION PHYSICS CONSULT: CPT | Performed by: RADIOLOGY

## 2021-11-30 PROCEDURE — 77387 GUIDANCE FOR RADJ TX DLVR: CPT | Performed by: STUDENT IN AN ORGANIZED HEALTH CARE EDUCATION/TRAINING PROGRAM

## 2021-12-01 ENCOUNTER — APPOINTMENT (OUTPATIENT)
Dept: RADIATION ONCOLOGY | Facility: CLINIC | Age: 56
End: 2021-12-01
Attending: RADIOLOGY
Payer: COMMERCIAL

## 2021-12-01 PROCEDURE — 77387 GUIDANCE FOR RADJ TX DLVR: CPT | Performed by: RADIOLOGY

## 2021-12-01 PROCEDURE — 77412 RADIATION TX DELIVERY LVL 3: CPT | Performed by: RADIOLOGY

## 2021-12-02 ENCOUNTER — OFFICE VISIT (OUTPATIENT)
Dept: RADIATION ONCOLOGY | Facility: CLINIC | Age: 56
End: 2021-12-02
Attending: RADIOLOGY
Payer: COMMERCIAL

## 2021-12-02 VITALS
OXYGEN SATURATION: 98 % | DIASTOLIC BLOOD PRESSURE: 67 MMHG | TEMPERATURE: 98.4 F | SYSTOLIC BLOOD PRESSURE: 131 MMHG | RESPIRATION RATE: 16 BRPM | HEART RATE: 64 BPM | WEIGHT: 171.8 LBS

## 2021-12-02 DIAGNOSIS — C50.411 MALIGNANT NEOPLASM OF UPPER-OUTER QUADRANT OF RIGHT BREAST IN FEMALE, ESTROGEN RECEPTOR POSITIVE (H): Primary | ICD-10-CM

## 2021-12-02 DIAGNOSIS — Z17.0 MALIGNANT NEOPLASM OF UPPER-OUTER QUADRANT OF RIGHT BREAST IN FEMALE, ESTROGEN RECEPTOR POSITIVE (H): Primary | ICD-10-CM

## 2021-12-02 PROCEDURE — 77412 RADIATION TX DELIVERY LVL 3: CPT | Performed by: RADIOLOGY

## 2021-12-02 PROCEDURE — 77387 GUIDANCE FOR RADJ TX DLVR: CPT | Performed by: RADIOLOGY

## 2021-12-02 RX ORDER — TAMOXIFEN CITRATE 20 MG/1
20 TABLET ORAL
COMMUNITY
Start: 2021-11-23

## 2021-12-02 NOTE — Clinical Note
2021         RE: Terrie Fernandes  [address] [place] [address]        Dear Colleague,    Thank you for referring your patient, Terrie Fernandes, to the [facility] RADIATION ONCOLOGY [place]. Please see a copy of my visit note below.         RADIATION ONCOLOGY WEEKLY TREATMENT VISIT NOTE    Assessment:       ICD-10-CM    1. Malignant neoplasm of upper-outer quadrant of right breast in female, estrogen receptor positive (H)  C50.411     Z17.0         Impression/Plan:   56 year old female with right IDC grade II, 1.1 cm, DCIS grade III, at least 2.2 cm, all margins negative (0.2cm from inferior IDC, 0.15cm from medial margin DCIS), (New posterior and inferior margins resected negative ) ER/[place] pos, HER-2 negative. 0/4 SLN pos. Required aspiration.     1. Continue radiation therapy as prescribed.    2. Continue Radiaplex x2 daily.     Radiation: Site: Right Breast  Stereotactic Radiosurgery: No  Today's Dose: 2650  Total Dose for Breast: 5240  Today's Fraction/Total Fraction Breast: 10/20    Subjective:     HPI: Terrie Fernandes is a 56 year old female with Malignant neoplasm of upper-outer quadrant of right breast in female, estrogen receptor positive (H)      The following portions of the patient's history were reviewed and updated as appropriate: allergies, current medications, past family history, past medical history, past social history, past surgical history and problem list.    Assessment   Body Site:  Breast                           Site: Right Breast  Stereotactic Radiosurgery: No  Today's Dose: 2650  Total Dose for Breast: 5240  Today's Fraction/Total Fraction Breast: 10/20  Drainage: 0: Absent                   Emotional Alteration    Copin: Effective  Comfort Alteration   KPS: 100 % Normal, no complaints  Fatigue (ONS scale): 0: No Fatigue;3: Mild Fatigue  Pain Location: denies   Nutrition Alteration   Anorexia: 0: None  Nausea: 0: None  Vomitin: None  Weight: 77.9 kg  (171 lb 12.8 oz)  Skin Alteration   Skin Sensation: 0: No problem  Skin Reaction: 0: None  AUA Assessment                                           Accompanied by       Objective:     Exam:     Vitals:    12/02/21 1416   BP: 131/67   Pulse: 64   Resp: 16   Temp: 98.4  F (36.9  C)   TempSrc: Oral   SpO2: 98%   Weight: 77.9 kg (171 lb 12.8 oz)       Wt Readings from Last 8 Encounters:   12/02/21 77.9 kg (171 lb 12.8 oz)   11/18/21 77.5 kg (170 lb 14.4 oz)   10/25/21 76.7 kg (169 lb 3.2 oz)       General: Alert and oriented. Sitting comfortably.   Patient non acute appearing, asymptomatic. Mask on. No cough, no respiratory distress.   Terrie has grade I erythema.    Treatment Summary to Date    Aria chart and setup information reviewed    I, Ally Presley MD personally performed the services described in this documentation, as scribed by Avila Díaz in my presence, and it is both accurate and complete.    Signed by: Ally Presley MD, MPH         Again, thank you for allowing me to participate in the care of your patient.        Sincerely,        Ally Presley MD

## 2021-12-02 NOTE — PROGRESS NOTES
RADIATION ONCOLOGY WEEKLY TREATMENT VISIT NOTE    Assessment:       ICD-10-CM    1. Malignant neoplasm of upper-outer quadrant of right breast in female, estrogen receptor positive (H)  C50.411     Z17.0         Impression/Plan:   56 year old female with right IDC grade II, 1.1 cm, DCIS grade III, at least 2.2 cm, all margins negative (0.2cm from inferior IDC, 0.15cm from medial margin DCIS), (New posterior and inferior margins resected negative ) ER/[place] pos, HER-2 negative. 0/4 SLN pos. Required aspiration.     1. Continue radiation therapy as prescribed.    2. Continue Radiaplex x2 daily.     Radiation: Site: Right Breast  Stereotactic Radiosurgery: No  Today's Dose: 2650  Total Dose for Breast: 5240  Today's Fraction/Total Fraction Breast: 10/20    Subjective:     HPI: Terrie Fernandes is a 56 year old female with Malignant neoplasm of upper-outer quadrant of right breast in female, estrogen receptor positive (H)      The following portions of the patient's history were reviewed and updated as appropriate: allergies, current medications, past family history, past medical history, past social history, past surgical history and problem list.    Assessment   Body Site:  Breast                           Site: Right Breast  Stereotactic Radiosurgery: No  Today's Dose: 2650  Total Dose for Breast: 5240  Today's Fraction/Total Fraction Breast: 10/20  Drainage: 0: Absent                   Emotional Alteration    Copin: Effective  Comfort Alteration   KPS: 100 % Normal, no complaints  Fatigue (ONS scale): 0: No Fatigue;3: Mild Fatigue  Pain Location: denies   Nutrition Alteration   Anorexia: 0: None  Nausea: 0: None  Vomitin: None  Weight: 77.9 kg (171 lb 12.8 oz)  Skin Alteration   Skin Sensation: 0: No problem  Skin Reaction: 0: None  AUA Assessment                                           Accompanied by       Objective:     Exam:     Vitals:    21 1416   BP: 131/67   Pulse: 64   Resp: 16   Temp: 98.4   F (36.9  C)   TempSrc: Oral   SpO2: 98%   Weight: 77.9 kg (171 lb 12.8 oz)       Wt Readings from Last 8 Encounters:   12/02/21 77.9 kg (171 lb 12.8 oz)   11/18/21 77.5 kg (170 lb 14.4 oz)   10/25/21 76.7 kg (169 lb 3.2 oz)       General: Alert and oriented. Sitting comfortably.   Patient non acute appearing, asymptomatic. Mask on. No cough, no respiratory distress.   Terrie has grade I erythema.    Treatment Summary to Date    Aria chart and setup information reviewed    I, Ally Presley MD personally performed the services described in this documentation, as scribed by Avila Díaz in my presence, and it is both accurate and complete.    Signed by: Ally Presley MD, MPH

## 2021-12-03 ENCOUNTER — APPOINTMENT (OUTPATIENT)
Dept: RADIATION ONCOLOGY | Facility: CLINIC | Age: 56
End: 2021-12-03
Attending: RADIOLOGY
Payer: COMMERCIAL

## 2021-12-03 PROCEDURE — 77387 GUIDANCE FOR RADJ TX DLVR: CPT | Performed by: STUDENT IN AN ORGANIZED HEALTH CARE EDUCATION/TRAINING PROGRAM

## 2021-12-03 PROCEDURE — 77412 RADIATION TX DELIVERY LVL 3: CPT | Performed by: STUDENT IN AN ORGANIZED HEALTH CARE EDUCATION/TRAINING PROGRAM

## 2021-12-06 ENCOUNTER — APPOINTMENT (OUTPATIENT)
Dept: RADIATION ONCOLOGY | Facility: CLINIC | Age: 56
End: 2021-12-06
Attending: RADIOLOGY
Payer: COMMERCIAL

## 2021-12-06 PROCEDURE — 77412 RADIATION TX DELIVERY LVL 3: CPT | Performed by: RADIOLOGY

## 2021-12-06 PROCEDURE — 77387 GUIDANCE FOR RADJ TX DLVR: CPT | Performed by: RADIOLOGY

## 2021-12-07 ENCOUNTER — APPOINTMENT (OUTPATIENT)
Dept: RADIATION ONCOLOGY | Facility: CLINIC | Age: 56
End: 2021-12-07
Attending: RADIOLOGY
Payer: COMMERCIAL

## 2021-12-07 PROCEDURE — 77387 GUIDANCE FOR RADJ TX DLVR: CPT | Performed by: RADIOLOGY

## 2021-12-07 PROCEDURE — 77412 RADIATION TX DELIVERY LVL 3: CPT | Performed by: RADIOLOGY

## 2021-12-08 ENCOUNTER — APPOINTMENT (OUTPATIENT)
Dept: RADIATION ONCOLOGY | Facility: CLINIC | Age: 56
End: 2021-12-08
Attending: RADIOLOGY
Payer: COMMERCIAL

## 2021-12-08 PROCEDURE — 77412 RADIATION TX DELIVERY LVL 3: CPT | Performed by: STUDENT IN AN ORGANIZED HEALTH CARE EDUCATION/TRAINING PROGRAM

## 2021-12-08 PROCEDURE — 77387 GUIDANCE FOR RADJ TX DLVR: CPT | Performed by: STUDENT IN AN ORGANIZED HEALTH CARE EDUCATION/TRAINING PROGRAM

## 2021-12-09 ENCOUNTER — OFFICE VISIT (OUTPATIENT)
Dept: RADIATION ONCOLOGY | Facility: CLINIC | Age: 56
End: 2021-12-09
Attending: RADIOLOGY
Payer: COMMERCIAL

## 2021-12-09 VITALS
OXYGEN SATURATION: 99 % | TEMPERATURE: 98.2 F | WEIGHT: 171.3 LBS | RESPIRATION RATE: 16 BRPM | HEART RATE: 63 BPM | DIASTOLIC BLOOD PRESSURE: 69 MMHG | SYSTOLIC BLOOD PRESSURE: 135 MMHG

## 2021-12-09 DIAGNOSIS — L58.0 ACUTE RADIATION DERMATITIS: ICD-10-CM

## 2021-12-09 DIAGNOSIS — C50.411 MALIGNANT NEOPLASM OF UPPER-OUTER QUADRANT OF RIGHT BREAST IN FEMALE, ESTROGEN RECEPTOR POSITIVE (H): Primary | ICD-10-CM

## 2021-12-09 DIAGNOSIS — L30.9 DERMATITIS: ICD-10-CM

## 2021-12-09 DIAGNOSIS — Z17.0 MALIGNANT NEOPLASM OF UPPER-OUTER QUADRANT OF RIGHT BREAST IN FEMALE, ESTROGEN RECEPTOR POSITIVE (H): Primary | ICD-10-CM

## 2021-12-09 PROCEDURE — 77387 GUIDANCE FOR RADJ TX DLVR: CPT | Performed by: RADIOLOGY

## 2021-12-09 PROCEDURE — 77412 RADIATION TX DELIVERY LVL 3: CPT | Performed by: RADIOLOGY

## 2021-12-09 PROCEDURE — 77336 RADIATION PHYSICS CONSULT: CPT | Performed by: RADIOLOGY

## 2021-12-09 PROCEDURE — 77427 RADIATION TX MANAGEMENT X5: CPT | Performed by: RADIOLOGY

## 2021-12-09 RX ORDER — FEXOFENADINE HCL 180 MG/1
180 TABLET ORAL DAILY
Qty: 30 TABLET | Refills: 0 | Status: SHIPPED | OUTPATIENT
Start: 2021-12-09

## 2021-12-09 RX ORDER — MOMETASONE FUROATE 1 MG/ML
SOLUTION TOPICAL
Qty: 60 ML | Refills: 0 | Status: SHIPPED | OUTPATIENT
Start: 2021-12-09

## 2021-12-09 ASSESSMENT — PAIN SCALES - GENERAL: PAINLEVEL: NO PAIN (0)

## 2021-12-09 NOTE — PROGRESS NOTES
RADIATION ONCOLOGY WEEKLY TREATMENT VISIT NOTE    Assessment:       ICD-10-CM    1. Malignant neoplasm of upper-outer quadrant of right breast in female, estrogen receptor positive (H)  C50.411     Z17.0         Impression/Plan:   56 year old female with right IDC grade II, 1.1 cm, DCIS grade III, at least 2.2 cm, all margins negative (0.2cm from inferior IDC, 0.15cm from medial margin DCIS), (New posterior and inferior margins resected negative ) ER/[place] pos, HER-2 negative. 0/4 SLN pos. Required aspiration.     1. Continue radiation therapy as prescribed.    2. Fexofenadine and mometasone.    3. Physical therapy referral for shoulder ROM and myofascial release which she will do after she returns from [place]     Radiation: Site: Right Breast  Stereotactic Radiosurgery: No  Today's Dose: 3975  Total Dose for Breast: 5240  Today's Fraction/Total Fraction Breast: 15/20      Subjective:     HPI: Terrie Fernandes is a 56 year old female with Malignant neoplasm of upper-outer quadrant of right breast in female, estrogen receptor positive (H)      The following portions of the patient's history were reviewed and updated as appropriate: allergies, current medications, past family history, past medical history, past social history, past surgical history and problem list.    Assessment   Body Site:  Breast                           Site: Right Breast  Stereotactic Radiosurgery: No  Today's Dose: 3975  Total Dose for Breast: 5240  Today's Fraction/Total Fraction Breast: 15/20  Drainage: 0: Absent                                     Sexuality Alteration                    Emotional Alteration       Comfort Alteration       Nutrition Alteration      Skin Alteration      AUA Assessment                                           Accompanied by       Objective:     Exam:     There were no vitals filed for this visit.    Wt Readings from Last 8 Encounters:   12/02/21 77.9 kg (171 lb 12.8 oz)   11/18/21 77.5 kg (170 lb  14.4 oz)   10/25/21 76.7 kg (169 lb 3.2 oz)       General: Alert and oriented. Sitting comfortably.   Patient non acute appearing, asymptomatic. Mask on. No cough, no respiratory distress.   Terrie has grade1 erythema.    Treatment Summary to Date    Aria chart and setup information reviewed    IAlly MD personally performed the services described in this documentation, as scribed by Avila Díaz in my presence, and it is both accurate and complete.    Signed by: Ally Presley MD, MPH

## 2021-12-09 NOTE — Clinical Note
12/9/2021         RE: Terrie Fernandes  [address] [place] [address]        Dear Colleague,    Thank you for referring your patient, Terrie Fernandes, to the [facility] RADIATION ONCOLOGY [place]. Please see a copy of my visit note below.         RADIATION ONCOLOGY WEEKLY TREATMENT VISIT NOTE    Assessment:       ICD-10-CM    1. Malignant neoplasm of upper-outer quadrant of right breast in female, estrogen receptor positive (H)  C50.411     Z17.0         Impression/Plan:   56 year old female with right IDC grade II, 1.1 cm, DCIS grade III, at least 2.2 cm, all margins negative (0.2cm from inferior IDC, 0.15cm from medial margin DCIS), (New posterior and inferior margins resected negative ) ER/[place] pos, HER-2 negative. 0/4 SLN pos. Required aspiration.     1. Continue radiation therapy as prescribed.    2. Fexofenadine and mometasone.    3. Physical therapy referral for shoulder ROM and myofascial release which she will do after she returns from [place]     Radiation: Site: Right Breast  Stereotactic Radiosurgery: No  Today's Dose: 3975  Total Dose for Breast: 5240  Today's Fraction/Total Fraction Breast: 15/20      Subjective:     HPI: Terrie Fernandes is a 56 year old female with Malignant neoplasm of upper-outer quadrant of right breast in female, estrogen receptor positive (H)      The following portions of the patient's history were reviewed and updated as appropriate: allergies, current medications, past family history, past medical history, past social history, past surgical history and problem list.    Assessment   Body Site:  Breast                           Site: Right Breast  Stereotactic Radiosurgery: No  Today's Dose: 3975  Total Dose for Breast: 5240  Today's Fraction/Total Fraction Breast: 15/20  Drainage: 0: Absent                                     Sexuality Alteration                    Emotional Alteration       Comfort Alteration       Nutrition Alteration       Skin Alteration      AUA Assessment                                           Accompanied by       Objective:     Exam:     There were no vitals filed for this visit.    Wt Readings from Last 8 Encounters:   12/02/21 77.9 kg (171 lb 12.8 oz)   11/18/21 77.5 kg (170 lb 14.4 oz)   10/25/21 76.7 kg (169 lb 3.2 oz)       General: Alert and oriented. Sitting comfortably.   Patient non acute appearing, asymptomatic. Mask on. No cough, no respiratory distress.   Terrie has grade1 erythema.    Treatment Summary to Date    Aria chart and setup information reviewed    I, Ally Presley MD personally performed the services described in this documentation, as scribed by Avila Díaz in my presence, and it is both accurate and complete.    Signed by: Ally Presley MD, MPH         Again, thank you for allowing me to participate in the care of your patient.        Sincerely,        Ally Presley MD

## 2021-12-10 ENCOUNTER — APPOINTMENT (OUTPATIENT)
Dept: RADIATION ONCOLOGY | Facility: CLINIC | Age: 56
End: 2021-12-10
Attending: RADIOLOGY
Payer: COMMERCIAL

## 2021-12-10 PROCEDURE — 77387 GUIDANCE FOR RADJ TX DLVR: CPT | Performed by: STUDENT IN AN ORGANIZED HEALTH CARE EDUCATION/TRAINING PROGRAM

## 2021-12-10 PROCEDURE — 77412 RADIATION TX DELIVERY LVL 3: CPT | Performed by: STUDENT IN AN ORGANIZED HEALTH CARE EDUCATION/TRAINING PROGRAM

## 2021-12-13 ENCOUNTER — TELEPHONE (OUTPATIENT)
Dept: ONCOLOGY | Facility: HOSPITAL | Age: 56
End: 2021-12-13

## 2021-12-13 ENCOUNTER — VIRTUAL VISIT (OUTPATIENT)
Dept: ONCOLOGY | Facility: CLINIC | Age: 56
End: 2021-12-13
Payer: COMMERCIAL

## 2021-12-13 ENCOUNTER — APPOINTMENT (OUTPATIENT)
Dept: RADIATION ONCOLOGY | Facility: CLINIC | Age: 56
End: 2021-12-13
Attending: RADIOLOGY
Payer: COMMERCIAL

## 2021-12-13 DIAGNOSIS — C50.911 INFILTRATING DUCTAL CARCINOMA OF RIGHT BREAST (H): ICD-10-CM

## 2021-12-13 DIAGNOSIS — F43.21 GRIEF REACTION: ICD-10-CM

## 2021-12-13 DIAGNOSIS — F43.23 ADJUSTMENT DISORDER WITH MIXED ANXIETY AND DEPRESSED MOOD: Primary | ICD-10-CM

## 2021-12-13 PROCEDURE — 90834 PSYTX W PT 45 MINUTES: CPT | Mod: TEL,95 | Performed by: SOCIAL WORKER

## 2021-12-13 PROCEDURE — 77280 THER RAD SIMULAJ FIELD SMPL: CPT | Mod: 26 | Performed by: RADIOLOGY

## 2021-12-13 PROCEDURE — 77412 RADIATION TX DELIVERY LVL 3: CPT | Performed by: RADIOLOGY

## 2021-12-13 PROCEDURE — 77387 GUIDANCE FOR RADJ TX DLVR: CPT | Performed by: RADIOLOGY

## 2021-12-13 PROCEDURE — 77280 THER RAD SIMULAJ FIELD SMPL: CPT | Performed by: RADIOLOGY

## 2021-12-13 NOTE — CONFIDENTIAL NOTE
Psychology Psychotherapy  Note-virtual telephone visit    Name:  Terrie Fernandes  :  1965  MRN:  [id number]      Date of Service: 2021  Duration: 50 minutes (1:00 to 1:50 PM)    The patient has been notified of following:      This telephone visit will be conducted via a call between you and your provider. We have found that certain health care needs can be provided without a face to face meeting.  This service lets us provide the care you need with a short phone conversation.      Telephone visits are billed at different rates depending on your insurance coverage. During this emergency period, for some insurers they may be billed the same as an in-person visit.  Please reach out to your insurance provider with any questions.     If during the course of the call the provider feels a telephone visit is not appropriate, you will not be charged for this service.     Patient has given verbal consent to a Telephone visit? Yes    Target Symptoms:    The patient was seen in light of concerns regarding symptoms of anxiety, grief and depression as evidenced by patient and staff report.    Participation:  The patient was able to participate and benefit from treatment as evidenced by her verbal expression of ideas and initiation of topics discussed.    Mental Status:    Mood/Affect:  normal affect  Suicidal Ideation:  absent  Homicidal Ideation:  absent  Thought process:  normal  Thought content:  Clear  Fund of Knowledge:  Sufficient  Attention/Concentration:  Normal  Language ability:  intact  Speech: normal  Memory:  recent and remote memory intact  Insight and Judgement:  good  Orientation:  self, place and time  Appearance: N/A-telephone visit  Eye Contact: N/A-telephone visit  Estimated IQ:  Average      Intervention:    Terrie was referred to me by Dr. Presley.  She has a diagnosis of  invasive ductal carcinoma, grade 2 of the upper outer quadrant of the right female breast.  She was diagnosed after a  "routine mammogram on 2021..  She had a lumpectomy and biopsy on 10/13/2021.  She has met with Dr. Rangel at the [facility]  With [facility].  She recently learned that her Oncotype had a score of 12, which indicates that she does not need chemotherapy.  She started radiation therapy at [facility] on Thursday, 2021.  She is happy that her radiation will be completed on 2021.  She processed her thoughts and feelings about her cancer diagnosis and her treatment.    Terrie also shared about her , Aristeo, who  6-1/2 years ago on May 24 from AML.  This was the day after the birthday her son, who was 18 years old at the time.   Aristeo was 48 years old when he .  She states that he was a completely healthy person who was in good physical shape.  She states that he had a healthy diet and did yoga.  He was hospitalized at [facility] shortly after his diagnosis and only lived for about a month from the time he was diagnosed.  He ended up being on life support, which may decided to discontinue.  He  worked as an .  She states that he was driven and very high energy.  She also talked about what a wonderful father he was to their children.  They had been  for 23 years.  She originally met him because her college roommate and his college roommate were dating.  A few years after graduation, her college roommate \"set up them up\".  We spent a great deal of time talking about her , Aristeo and how she is coping with her grief.  She states that her focus after his death was to be supportive to her children, and she tended to set her own grief aside.  By being back at the cancer clinic, she found that some unresolved grief issues have surfaced.  We discussed strategies to help her work through her grief.    When Terrie was diagnosed with breast cancer, she was extremely concerned about her children and worry about how this would affect them as they had already " "lost their father from cancer.  She has 3 children, 2 daughters and a son.  Her son was 18 years old when his father .  She states that he took it the hardest as they were exceptionally close.  It also brought out some issues of anger after his death.  She also has 2 daughters who were 14 and 16 the time of her father's death.  Currently her son, Chris, is 25 years old.  He lives in [place].  He went to college in [employer] and became a .  He developed Crohn's disease during his sophomore year of college.  She worried most about how he would handle her cancer.  She was relieved that she does not have to have chemotherapy and has made the decision that she is not cannot share with him about her cancer, possibly until she has completed her treatment.  Her daughters include Funmi, age 23 who she describes as being strong-willed and sweet.  Funmi went to [employer] and is in her second year at the [employer] school.  Her youngest daughter is Digna who currently is a senior at [employer], studying social work.  She also is applying for graduate school for next year, possibly in [place] for [employer].  She states that she has \"heart of gold\" and tends to share every emotion with Terrie.  Since COVID-, both of the girls have been living at home with Terrie.  Digna has struggled emotionally during this time of COVID-19 where there is more isolation during her time in college.  Digna play soccer in college.  Terrie decided to tell her 2 daughters about her cancer, as she feels she needs their help.  She is sharing the news about her cancer with a very small group of her closest support.  System.  She is struggling as her daughters have not been as helpful as she had hoped.  We talked about having an open and honest discussion with them about how she will need them to step it up with support during this time of the ending of her radiation therapy chemotherapy next few " "months.    Terrie grew up in [place], which she thought would be comforting going to [employer].  She felt that it did trigger some of the emotions regarding her past.  Terrie's parents are 81 and 82.  They live between son farhad [place] in [facility] [place] [facility].  She states that her mom is in great health, but had a stent placement recently.  She states that her father fell this summer.  He ended up seeing \"okay\", but she realizes that they are starting to age.  Terrie and her daughters are leaving on Friday, 2021 to [place] to stay with her parents.  It will be their parents 60th anniversary.  Her son, Chris will be staying from 2021 until 2021.  Her daughters will be staying for about a month, during her break from school.  She plans to stay until the first week of February.    Terrie was the middle child in a family of 3.  She has an older brother, Cornell who is 2 years older than her.  He lives in [place] and she is very close with his wife.  Her brother works as a federal court .  Her younger brother, Radhames is 2 years younger.  He also is an  and lives in [place].  He comes to visit in the summer and she also goes out to visit him once a year.    Terrie indicates that she was raised Evangelical, but currently is not practicing her michele.  She processed her thoughts and feelings about her spirituality and her need to take a break during her time of grieving.    Terrie has a wonderful support network of friends.  She states that the neighbors across the street had a 19-year-old son who  when he was a abraham in college, 2 years before Aristeo' death.  They have been a wonderful support system for her during her time of grieving.    In the very difficult situation surrounding her 's death was that he did not have a relationship with his parents.  Prior to him being sick, they had not had any contact with his parents for 10 years.  She talked about how she navigated this issue at the time of his " death.    Terrie shared her thoughts and beliefs about after life.  She has the image of Aristeo being in a [place] sitting on the front porch with all of the dogs that the family has had with him, along with his grandfather.  She states that the anniversary of his death has always been tricky as it is a day away from her son's birthday.  We discussed strategies that might help navigating these events.    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    Terrie does not have a mental health history.  She does not meet diagnostic criteria for an mood disorder or an anxiety disorder.  She is experiencing symptoms of anxiety and depression related to coping with her cancer diagnosis and working for her issues of grief.  She also meets diagnostic criteria for grief reaction.  She is interested in ongoing psychotherapy to help her manage the emotional aspects of her cancer and cancer treatment.  She also is interested in assistance in helping her work through her grief and loss related to the death of her .  We will plan to schedule her next session as an in person visit at [facility], either before or after her radiation therapy.    Psychoterapeutic Techniques:  Cognitive-behavioral therapy, motivational interviewing and supportive psychotherapy strategies were utilized.    Necessity:    The session was necessary for the care of the patient to address symptoms of depression and anxiety related to the patient's medical condition.    Progress/Plan:    Terrie is interested in ongoing individual psychotherapy to help her work through the emotional aspects of her cancer and cancer treatment.  She also is interested in working through issues related to grief and loss pertaining to the death of her  from cancer.  We discussed strategies to help her cope.  We also talked about ways to navigate through some of the difficult times, especially the anniversary of his death, that is the day  after her son's birthday.  We also talked about how to cope with her grief during this holiday season.  Terrie would like to follow-up with me after she returns from [place] in February, 2022.  She will call our information coordinators to schedule future appointments.    Diagnosis:    1.  Adjustment disorder with mixed anxiety and depressed mood  2.  Grief reaction  3.  Invasive ductal carcinoma         Note: I have reevaluated the above information with Terrie and appropriate changes were made and additional information was added. ?Other information was consistent from the note that was made on?11/10/2021.     This note was created with the help of Dragon dictation system. ?Grammatical and typing errors are not intentional.     ?Review of long-term goals: Treatment plan was reviewed and updated.    Provider: Paloma Higgins MA, LP, LICSW    Date:  11/10/2021  Time:  12:26 PM

## 2021-12-14 ENCOUNTER — APPOINTMENT (OUTPATIENT)
Dept: RADIATION ONCOLOGY | Facility: CLINIC | Age: 56
End: 2021-12-14
Attending: RADIOLOGY
Payer: COMMERCIAL

## 2021-12-14 PROCEDURE — 77387 GUIDANCE FOR RADJ TX DLVR: CPT | Performed by: STUDENT IN AN ORGANIZED HEALTH CARE EDUCATION/TRAINING PROGRAM

## 2021-12-14 PROCEDURE — 77412 RADIATION TX DELIVERY LVL 3: CPT | Performed by: STUDENT IN AN ORGANIZED HEALTH CARE EDUCATION/TRAINING PROGRAM

## 2021-12-15 ENCOUNTER — APPOINTMENT (OUTPATIENT)
Dept: RADIATION ONCOLOGY | Facility: CLINIC | Age: 56
End: 2021-12-15
Attending: RADIOLOGY
Payer: COMMERCIAL

## 2021-12-15 PROCEDURE — 77412 RADIATION TX DELIVERY LVL 3: CPT | Performed by: STUDENT IN AN ORGANIZED HEALTH CARE EDUCATION/TRAINING PROGRAM

## 2021-12-15 PROCEDURE — 77387 GUIDANCE FOR RADJ TX DLVR: CPT | Performed by: STUDENT IN AN ORGANIZED HEALTH CARE EDUCATION/TRAINING PROGRAM

## 2021-12-16 ENCOUNTER — OFFICE VISIT (OUTPATIENT)
Dept: RADIATION ONCOLOGY | Facility: CLINIC | Age: 56
End: 2021-12-16
Attending: RADIOLOGY
Payer: COMMERCIAL

## 2021-12-16 VITALS — WEIGHT: 171.2 LBS

## 2021-12-16 DIAGNOSIS — C50.411 MALIGNANT NEOPLASM OF UPPER-OUTER QUADRANT OF RIGHT BREAST IN FEMALE, ESTROGEN RECEPTOR POSITIVE (H): Primary | ICD-10-CM

## 2021-12-16 DIAGNOSIS — Z17.0 MALIGNANT NEOPLASM OF UPPER-OUTER QUADRANT OF RIGHT BREAST IN FEMALE, ESTROGEN RECEPTOR POSITIVE (H): Primary | ICD-10-CM

## 2021-12-16 PROCEDURE — 77412 RADIATION TX DELIVERY LVL 3: CPT | Performed by: RADIOLOGY

## 2021-12-16 PROCEDURE — 77336 RADIATION PHYSICS CONSULT: CPT | Performed by: RADIOLOGY

## 2021-12-16 PROCEDURE — 77387 GUIDANCE FOR RADJ TX DLVR: CPT | Performed by: RADIOLOGY

## 2021-12-16 PROCEDURE — 77427 RADIATION TX MANAGEMENT X5: CPT | Performed by: RADIOLOGY

## 2021-12-16 NOTE — PROGRESS NOTES
RADIATION ONCOLOGY WEEKLY TREATMENT VISIT NOTE    Assessment:       ICD-10-CM    1. Malignant neoplasm of upper-outer quadrant of right breast in female, estrogen receptor positive (H)  C50.411     Z17.0         Impression/Plan:   56 year old female with right IDC grade II, 1.1 cm, DCIS grade III, at least 2.2 cm, all margins negative (0.2cm from inferior IDC, 0.15cm from medial margin DCIS), (New posterior and inferior margins resected negative ) ER/[place] pos, HER-2 negative. 0/4 SLN pos. Required aspiration.     1. Activity modification PRN fatigue.    2. Skin cares with Radiaplex and mometasone x3 daily.     3. Long term follow up with medical oncology.    4. Return to myself PRN.     Radiation: Site: Right breast  Stereotactic Radiosurgery: No  Today's Dose: 5240  Total Dose for Breast: 5240  Today's Fraction/Total Fraction Breast: 20/20      Subjective:     Radiation Treatment Summary    HISTORY: Terrie Fernandes is a 56 year old female who was treated with radiation therapy for right breast IDC.     The patient had a bilateral screening mammogram on 9/20/2021, imaging studies showed an irregular mass within the right breast at 9:00 position. A needle biopsy of right breast on 9/21/2021 revealed IDC grade II and DCIS. MR bilateral breast on 9/28/2021 confirmed biopsy proven malignancy in right breast without evidence of further disease in either breast.     The patient had a right breast lumpectomy with SLN biopsy performed on 10/13/2021, final pathology showed IDC grade II, 1.1 cm, DCIS grade III, at least 2.2 cm, final margins negative (Initial 0.2cm from inferior IDC, 0.15cm from medial margin DCIS- additional tissue taken negative for malignancy ), ER/[place] pos, HER-2 negative. 0/4 SLN pos. Post op course complicated by seroma needing aspiration.     SITE TREATED: right breast  TOTAL DOSE: 5240 cGy  NUMBER OF FRACTIONS: 20  DATES COMPLETED: 11/18/2021 - 12/16/2021  CONCURRENT CHEMOTHERAPY: No  ADJUVANT  THERAPY:Yes    She tolerated the treatment without unexpected side effects.       The following portions of the patient's history were reviewed and updated as appropriate: allergies, current medications, past family history, past medical history, past social history, past surgical history and problem list.    Assessment   Body Site:  Breast                           Site: Right breast  Stereotactic Radiosurgery: No  Today's Dose: 5240  Total Dose for Breast: 5240  Today's Fraction/Total Fraction Breast: 20/20                                     Sexuality Alteration                    Emotional Alteration       Comfort Alteration       Nutrition Alteration   Weight: 77.7 kg (171 lb 3.2 oz)  Skin Alteration      AUA Assessment                                           Accompanied by       Objective:     Exam:     Vitals:    12/16/21 1300   Weight: 77.7 kg (171 lb 3.2 oz)       Wt Readings from Last 8 Encounters:   12/16/21 77.7 kg (171 lb 3.2 oz)   12/09/21 77.7 kg (171 lb 4.8 oz)   12/02/21 77.9 kg (171 lb 12.8 oz)   11/18/21 77.5 kg (170 lb 14.4 oz)   10/25/21 76.7 kg (169 lb 3.2 oz)       General: Alert and oriented. Sitting comfortably.   Patient non acute appearing, asymptomatic. Mask on. No cough, no respiratory distress.   Terrie has grade I erythema to the right breast.     Treatment Summary to Date    Aria chart and setup information reviewed    IAlly MD personally performed the services described in this documentation, as scribed by Avila Díaz in my presence, and it is both accurate and complete.    Signed by: Ally Presley MD, MPH

## 2021-12-16 NOTE — Clinical Note
12/16/2021         RE: Terrie Fernandes  [address] [place] [address]        Dear Colleague,    Thank you for referring your patient, Terrie Fernandes, to the [facility] RADIATION ONCOLOGY [place]. Please see a copy of my visit note below.         RADIATION ONCOLOGY WEEKLY TREATMENT VISIT NOTE    Assessment:       ICD-10-CM    1. Malignant neoplasm of upper-outer quadrant of right breast in female, estrogen receptor positive (H)  C50.411     Z17.0         Impression/Plan:   56 year old female with right IDC grade II, 1.1 cm, DCIS grade III, at least 2.2 cm, all margins negative (0.2cm from inferior IDC, 0.15cm from medial margin DCIS), (New posterior and inferior margins resected negative ) ER/ND pos, HER-2 negative. 0/4 SLN pos. Required aspiration.     1. Activity modification PRN fatigue.    2. Skin cares with Radiaplex and mometasone x3 daily.     3. Long term follow up with medical oncology.    4. Return to myself PRN.     Radiation: Site: Right breast  Stereotactic Radiosurgery: No  Today's Dose: 5240  Total Dose for Breast: 5240  Today's Fraction/Total Fraction Breast: 20/20      Subjective:     Radiation Treatment Summary    HISTORY: Terrie Fernandes is a 56 year old female who was treated with radiation therapy for right breast IDC.     The patient had a bilateral screening mammogram on 9/20/2021, imaging studies showed an irregular mass within the right breast at 9:00 position. A needle biopsy of right breast on 9/21/2021 revealed IDC grade II and DCIS. MR bilateral breast on 9/28/2021 confirmed biopsy proven malignancy in right breast without evidence of further disease in either breast.     The patient had a right breast lumpectomy with SLN biopsy performed on 10/13/2021, final pathology showed IDC grade II, 1.1 cm, DCIS grade III, at least 2.2 cm, final margins negative (Initial 0.2cm from inferior IDC, 0.15cm from medial margin DCIS- additional tissue taken negative for  malignancy ), ER/ID pos, HER-2 negative. 0/4 SLN pos. Post op course complicated by seroma needing aspiration.     SITE TREATED: right breast  TOTAL DOSE: 5240 cGy  NUMBER OF FRACTIONS: 20  DATES COMPLETED: 11/18/2021 - 12/16/2021  CONCURRENT CHEMOTHERAPY: No  ADJUVANT THERAPY:Yes    She tolerated the treatment without unexpected side effects.       The following portions of the patient's history were reviewed and updated as appropriate: allergies, current medications, past family history, past medical history, past social history, past surgical history and problem list.    Assessment   Body Site:  Breast                           Site: Right breast  Stereotactic Radiosurgery: No  Today's Dose: 5240  Total Dose for Breast: 5240  Today's Fraction/Total Fraction Breast: 20/20                                     Sexuality Alteration                    Emotional Alteration       Comfort Alteration       Nutrition Alteration   Weight: 77.7 kg (171 lb 3.2 oz)  Skin Alteration      AUA Assessment                                           Accompanied by       Objective:     Exam:     Vitals:    12/16/21 1300   Weight: 77.7 kg (171 lb 3.2 oz)       Wt Readings from Last 8 Encounters:   12/16/21 77.7 kg (171 lb 3.2 oz)   12/09/21 77.7 kg (171 lb 4.8 oz)   12/02/21 77.9 kg (171 lb 12.8 oz)   11/18/21 77.5 kg (170 lb 14.4 oz)   10/25/21 76.7 kg (169 lb 3.2 oz)       General: Alert and oriented. Sitting comfortably.   Patient non acute appearing, asymptomatic. Mask on. No cough, no respiratory distress.   Terrie has grade I erythema to the right breast.     Treatment Summary to Date    Aria chart and setup information reviewed    I, Ally Presley MD personally performed the services described in this documentation, as scribed by Avila Díaz in my presence, and it is both accurate and complete.    Signed by: Ally Presley MD, MPH         Again, thank you for allowing me to participate in the care of your  patient.        Sincerely,        Ally Presley MD

## 2021-12-29 ENCOUNTER — TELEPHONE (OUTPATIENT)
Dept: RADIATION ONCOLOGY | Facility: HOSPITAL | Age: 56
End: 2021-12-29
Payer: COMMERCIAL

## 2021-12-29 NOTE — TELEPHONE ENCOUNTER
Pt called s/p RT, no answer, LM. Informed this is a courtesy call and to call with any questions or concerns. Follow up in clinic only as needed.

## 2024-02-26 ENCOUNTER — TRANSFERRED RECORDS (OUTPATIENT)
Dept: HEALTH INFORMATION MANAGEMENT | Facility: CLINIC | Age: 59
End: 2024-02-26
Payer: COMMERCIAL

## 2024-03-18 ENCOUNTER — TRANSFERRED RECORDS (OUTPATIENT)
Dept: HEALTH INFORMATION MANAGEMENT | Facility: CLINIC | Age: 59
End: 2024-03-18
Payer: COMMERCIAL